# Patient Record
Sex: FEMALE | Race: WHITE | NOT HISPANIC OR LATINO | Employment: FULL TIME | URBAN - METROPOLITAN AREA
[De-identification: names, ages, dates, MRNs, and addresses within clinical notes are randomized per-mention and may not be internally consistent; named-entity substitution may affect disease eponyms.]

---

## 2017-12-11 ENCOUNTER — HISTORICAL (OUTPATIENT)
Dept: ADMINISTRATIVE | Facility: HOSPITAL | Age: 57
End: 2017-12-11

## 2018-03-06 ENCOUNTER — HISTORICAL (OUTPATIENT)
Dept: PREADMISSION TESTING | Facility: HOSPITAL | Age: 58
End: 2018-03-06

## 2018-03-15 ENCOUNTER — HISTORICAL (OUTPATIENT)
Dept: SURGERY | Facility: HOSPITAL | Age: 58
End: 2018-03-15

## 2018-05-07 ENCOUNTER — HISTORICAL (OUTPATIENT)
Dept: ADMINISTRATIVE | Facility: HOSPITAL | Age: 58
End: 2018-05-07

## 2019-12-06 ENCOUNTER — HISTORICAL (OUTPATIENT)
Dept: ADMINISTRATIVE | Facility: HOSPITAL | Age: 59
End: 2019-12-06

## 2021-08-16 ENCOUNTER — HISTORICAL (OUTPATIENT)
Dept: ADMINISTRATIVE | Facility: HOSPITAL | Age: 61
End: 2021-08-16

## 2021-09-20 ENCOUNTER — HISTORICAL (OUTPATIENT)
Dept: RADIOLOGY | Facility: HOSPITAL | Age: 61
End: 2021-09-20

## 2022-04-10 ENCOUNTER — HISTORICAL (OUTPATIENT)
Dept: ADMINISTRATIVE | Facility: HOSPITAL | Age: 62
End: 2022-04-10

## 2022-04-25 VITALS
OXYGEN SATURATION: 97 % | DIASTOLIC BLOOD PRESSURE: 72 MMHG | SYSTOLIC BLOOD PRESSURE: 120 MMHG | HEIGHT: 66 IN | BODY MASS INDEX: 22.04 KG/M2 | WEIGHT: 137.13 LBS

## 2022-04-30 NOTE — OP NOTE
Patient:   Joslyn Ramirez            MRN: [id number]            FIN: [id number]               Age:   57 years     Sex:  Female     :  1960   Associated Diagnoses:   None   Author:   Jonathan NATHAN MD, Johnnie MORRISON      SURGEON: Johnnie Castillo MD    PREOPERATIVE DIAGNOSIS:   Right knee loose body, osteochondral defect     POSTOPERATIVE DIAGNOSIS:   Right knee loose body, osteochondral defect, medial meniscus tear,     PROCEDURE PERFORMED:  1.  Right knee arthroscopic loose body removal  2.  Right knee arthroscopic microfracture to medial femoral condyle  3.  Right knee knee arthroscopic partial medial meniscectomy    ESTIMATED BLOOD LOSS: 10cc.      COMPLICATIONS: None.    TOURNIQUET TIME: 17 minutes.    ANTIBIOTICS: Ancef     INDICATIONS FOR PROCEDURE: Joslyn is a 57y.o. female who has had ongoing right knee pain. The patient has had to limit activity due to intermittent pain & occasional mechanical symptoms. An MRI was performed that showed a osteochondral defect of her medial femoral condyle with a loose body that I felt would be amenable to loose body removal and microfracture. The patient decided to opt for surgery after failing conservative management.    OPERATIVE REPORT: Joslyn was initially seen in the preoperative holding area where history and physical were reviewed without change. The operative leg was marked, consents were reviewed, and any questions were answered for the patient and family.The patient was then taken back to the operating room, placed supine on the operating table with all bony prominences padded. Then a nonsterile tourniquet was placed around the upper thigh. The patient was induced under general anesthesia.The operative lower extremity was prepped and draped in standard sterile fashion. A timeout led by the surgeon was performed, and preoperative antibiotics were given. The limp was exsanguinated with gravity. The tourniquet was raised to 100 mmHg over the systolic blood  pressure.    The knee was then flexed and the inferolateral portal was created with an #11 blade scalpel.    The camera was introduced, using the blunt trocar, into the suprapatellar pouch. The undersurface of the patella was found to have no chondral wear and the trochlear groove was found to have no chondral wear . The camera was then taken down into the lateral gutter, where no loose bodies and no plica were found. The camera was then brought into the medial gutter, where no loose bodies and no plica were seen. The camera was then brought into the medial compartment.    An inferomedial portal was then localized with a spinal needle, and created using an #11 blade.  The loose body was located in the medial compartment.  It was right in front of the donor site.  I used a grasper to remove this.  The bed of the OCD was then left.  It was approximately 1.5 x 1.5 cm.  It had a unstable cartilage on the medial aspect which was trimmed with a shaver.  I then used the microfracture awls to poke holes into the bed of the OCD in order to stimulate fibrocartilage.  The medial meniscus was probed and found to have a complex tear of the posterior horn. A combination of baskets and gabe were used to debride the meniscal flap down to a stable margin of approximately 80 percent remaining and then shaver was used to smooth the edges.The medial tibial plateau demonstrated no wear.    The camera was then turned to the notch, where the ACL was found to be intact.    Then the leg was brought into figure-of-four position. The camera was brought into the lateral compartment. The lateral meniscus was probed and found to be stable. The lateral femoral condyle was found to have no chondral wear. The lateral tibial plateau had no chondral wear.    The knee was once more examined for loose bodies, of which none were found. The knee was then evacuated of all fluids. The portals were closed with 3-0 monocyrl interrupted sutures and  steristrips. 10mL of 0.25% Bupivicaine were infiltrated around each portal. A sterile dressing was placed, and the tourniquet was deflated after 17 minutes. The patient was awakened from anesthesia and taken to the postoperative care unit in stable condition.

## 2022-05-04 ENCOUNTER — TELEPHONE (OUTPATIENT)
Dept: ADMINISTRATIVE | Facility: HOSPITAL | Age: 62
End: 2022-05-04

## 2022-05-04 DIAGNOSIS — M54.16 LUMBAR RADICULOPATHY: Primary | ICD-10-CM

## 2022-05-05 RX ORDER — GABAPENTIN 300 MG/1
CAPSULE ORAL
Qty: 90 CAPSULE | Refills: 2 | Status: SHIPPED | OUTPATIENT
Start: 2022-05-05 | End: 2022-06-14

## 2022-05-05 NOTE — TELEPHONE ENCOUNTER
I spoke with the patient.  She has pain only in the left leg, which is new since her last visit here in February.  The pain began Monday or Tuesday, no known injury or activity that triggered.  When the pain started, it involved the lateral thigh, all aspects of the lower leg, and plantar foot.  She describes it as feeling like a vice  around the leg.  She did not get any relief with Mobic.  She took Ibuprofen every 6hrs yesterday and says the pain is better today.  It is now mainly in the lateral lower leg.  She describes it as a burning nerve type pain.  She says that when she crouches down, like to pull weeds from her flower bed, the lower leg feels like it will explode.  She continues with pain and stiffness across the lower back, which is not new.  She continues to go to physical therapy for SI joint stabilization, which she feels is helping that pain.  She has also been wearing an SI belt.  She has been sleeping in the SI belt for the past 3-4 weeks and wonders if this could be causing something to pinch a nerve.  She denies any new weakness in the left leg.  She has problems with the right leg d/t a pulled hamstring.  She is seeing Dr. Castillo for this tomorrow.  She has seen Dr. Trinity Seay for SI joint injections, but did not feel like they helped much.  She says he mentioned that he thought her problem was coming from the facets.  She was asking about imaging to make sure nothing moved or is pushing on a nerve.  Her last MRI was in September, which was prior to the left leg pain.  She is already scheduled to follow up here on 5/19.  She is leaving next Wednesday to go on a trip.  They are flying and planning to hike.  We talked about adding gabapentin.  She has not taken it since before her lumbar surgery, but does not recall any side effects.  I also recommended she talk to Dr. Trinity Seay about a possible injection to cool things off and get her through the trip.  She is going to contact his office to  discuss.  She would like to try the gabapentin as well.  I gave her instructions.  She would like the medication sent in to [facility] in [place].  I told her we could work on setting up an MRI prior to her f/u if things do not improve.  She will update us on how she is doing early next week.

## 2022-05-06 ENCOUNTER — OFFICE VISIT (OUTPATIENT)
Dept: ORTHOPEDICS | Facility: CLINIC | Age: 62
End: 2022-05-06
Payer: COMMERCIAL

## 2022-05-06 VITALS
HEIGHT: 66 IN | DIASTOLIC BLOOD PRESSURE: 76 MMHG | BODY MASS INDEX: 20.89 KG/M2 | SYSTOLIC BLOOD PRESSURE: 122 MMHG | HEART RATE: 60 BPM | WEIGHT: 130 LBS

## 2022-05-06 DIAGNOSIS — M54.32 LEFT SIDED SCIATICA: ICD-10-CM

## 2022-05-06 DIAGNOSIS — M70.61 TROCHANTERIC BURSITIS, RIGHT HIP: Primary | ICD-10-CM

## 2022-05-06 PROCEDURE — 20610 LARGE JOINT ASPIRATION/INJECTION: R GREATER TROCHANTERIC BURSA: ICD-10-PCS | Mod: RT,,, | Performed by: ORTHOPAEDIC SURGERY

## 2022-05-06 PROCEDURE — 3074F SYST BP LT 130 MM HG: CPT | Mod: CPTII,,, | Performed by: ORTHOPAEDIC SURGERY

## 2022-05-06 PROCEDURE — 1159F MED LIST DOCD IN RCRD: CPT | Mod: CPTII,,, | Performed by: ORTHOPAEDIC SURGERY

## 2022-05-06 PROCEDURE — 20610 DRAIN/INJ JOINT/BURSA W/O US: CPT | Mod: RT,,, | Performed by: ORTHOPAEDIC SURGERY

## 2022-05-06 PROCEDURE — 3074F PR MOST RECENT SYSTOLIC BLOOD PRESSURE < 130 MM HG: ICD-10-PCS | Mod: CPTII,,, | Performed by: ORTHOPAEDIC SURGERY

## 2022-05-06 PROCEDURE — 3078F PR MOST RECENT DIASTOLIC BLOOD PRESSURE < 80 MM HG: ICD-10-PCS | Mod: CPTII,,, | Performed by: ORTHOPAEDIC SURGERY

## 2022-05-06 PROCEDURE — 1159F PR MEDICATION LIST DOCUMENTED IN MEDICAL RECORD: ICD-10-PCS | Mod: CPTII,,, | Performed by: ORTHOPAEDIC SURGERY

## 2022-05-06 PROCEDURE — 3008F PR BODY MASS INDEX (BMI) DOCUMENTED: ICD-10-PCS | Mod: CPTII,,, | Performed by: ORTHOPAEDIC SURGERY

## 2022-05-06 PROCEDURE — 3008F BODY MASS INDEX DOCD: CPT | Mod: CPTII,,, | Performed by: ORTHOPAEDIC SURGERY

## 2022-05-06 PROCEDURE — 99213 OFFICE O/P EST LOW 20 MIN: CPT | Mod: 25,,, | Performed by: ORTHOPAEDIC SURGERY

## 2022-05-06 PROCEDURE — 3078F DIAST BP <80 MM HG: CPT | Mod: CPTII,,, | Performed by: ORTHOPAEDIC SURGERY

## 2022-05-06 PROCEDURE — 99213 PR OFFICE/OUTPT VISIT, EST, LEVL III, 20-29 MIN: ICD-10-PCS | Mod: 25,,, | Performed by: ORTHOPAEDIC SURGERY

## 2022-05-06 RX ORDER — MELOXICAM 15 MG/1
15 TABLET ORAL DAILY
COMMUNITY
Start: 2022-04-06 | End: 2022-05-30

## 2022-05-06 RX ORDER — BETAMETHASONE SODIUM PHOSPHATE AND BETAMETHASONE ACETATE 3; 3 MG/ML; MG/ML
6 INJECTION, SUSPENSION INTRA-ARTICULAR; INTRALESIONAL; INTRAMUSCULAR; SOFT TISSUE
Status: DISCONTINUED | OUTPATIENT
Start: 2022-05-06 | End: 2022-05-06 | Stop reason: HOSPADM

## 2022-05-06 RX ORDER — METHYLPREDNISOLONE 4 MG/1
TABLET ORAL
Qty: 21 EACH | Refills: 0 | Status: SHIPPED | OUTPATIENT
Start: 2022-05-06 | End: 2022-05-27

## 2022-05-06 RX ORDER — LIDOCAINE HYDROCHLORIDE 20 MG/ML
5 INJECTION, SOLUTION EPIDURAL; INFILTRATION; INTRACAUDAL; PERINEURAL
Status: DISCONTINUED | OUTPATIENT
Start: 2022-05-06 | End: 2022-05-06 | Stop reason: HOSPADM

## 2022-05-06 RX ADMIN — BETAMETHASONE SODIUM PHOSPHATE AND BETAMETHASONE ACETATE 6 MG: 3; 3 INJECTION, SUSPENSION INTRA-ARTICULAR; INTRALESIONAL; INTRAMUSCULAR; SOFT TISSUE at 09:05

## 2022-05-06 RX ADMIN — LIDOCAINE HYDROCHLORIDE 5 ML: 20 INJECTION, SOLUTION EPIDURAL; INFILTRATION; INTRACAUDAL; PERINEURAL at 09:05

## 2022-05-06 NOTE — PROGRESS NOTES
Chief Complaint:   Chief Complaint   Patient presents with    Right Thigh - Injury    Pain     Patient states shes had a couple of falls since last seen thinks she might have made the tear worse, Left leg possible blood clot       Consulting Physician: No ref. provider found    History of present illness:    she  is a pleasant 61 y.o. year old female with right lateral hip pain.  She has had a couple falls.  But also think she really rotated in physical therapy when she had her SI joint adjusted.  The pain is located laterally.  It is worse with activity.  It is somewhat better at rest.  She has a hiking trip upcoming for her 62th birthday.  She denies any numbness or tingling although does have some pain over her left lower extremity.  She has has chronic back pain which has been previously managed with injections and formal physical therapy.    Past Medical History:   Diagnosis Date    Osteoarthritis        Past Surgical History:   Procedure Laterality Date    BACK SURGERY      lumbar and cervicle    KNEE SURGERY      NECK SURGERY      SINUS SURGERY         Current Outpatient Medications   Medication Sig    meloxicam (MOBIC) 15 MG tablet Take 15 mg by mouth once daily.     No current facility-administered medications for this visit.       Review of patient's allergies indicates:   Allergen Reactions    Codeine Hives       History reviewed. No pertinent family history.    Social History     Socioeconomic History    Marital status:    Tobacco Use    Smoking status: Never Smoker    Smokeless tobacco: Never Used   Substance and Sexual Activity    Alcohol use: Yes       Review of Systems:    Constitution:   Denies chills, fever, and sweats.  HENT:   Denies headaches or blurry vision.  Cardiovascular:  Denies chest pain or irregular heart beat.  Respiratory:   Denies cough or shortness of breath.  Gastrointestinal:  Denies abdominal pain, nausea, or vomiting.  Musculoskeletal:   Denies muscle  "cramps.  Neurological:   Denies dizziness or focal weakness.  Psychiatric/Behavior: Normal mental status.  Hematology/Lymph:  Denies bleeding problem or easy bruising/bleeding.  Skin:    Denies rash or suspicious lesions.    Examination:    Vital Signs:    Vitals:    05/06/22 0954   BP: 122/76   Pulse: 60   Weight: 59 kg (130 lb)   Height: 5' 6" (1.676 m)   PainSc:   5       Body mass index is 20.98 kg/m².    Constitution:   Well-developed, well nourished patient in no acute distress.  Neurological:   Alert and oriented x 3 and cooperative to examination.     Psychiatric/Behavior: Normal mental status.  Respiratory:   No shortness of breath.  Eyes:    Extraoccular muscles intact  Skin:    No scars, rash or suspicious lesions.    MSK:   Focused exam of the right hip shows full range of motion of the hip with flexion to 100°, internal rotation 20°, external rotation 30°.  She is tender over her external rotators and greater trochanter.           Assessment: Trochanteric bursitis, right hip    Left sided sciatica        Plan:  We are going to try a right hip greater trochanteric bursa injection today.  Will also start a Medrol Dosepak for her left-sided sciatica  "

## 2022-05-06 NOTE — PROCEDURES
Large Joint Aspiration/Injection: R greater trochanteric bursa    Date/Time: 5/6/2022 9:45 AM  Performed by: Johnnie Castillo Jr., MD  Authorized by: Johnnie Castillo Jr., MD     Consent Done?:  Yes (Verbal)  Indications:  Arthritis  Site marked: the procedure site was marked    Timeout: prior to procedure the correct patient, procedure, and site was verified    Prep: patient was prepped and draped in usual sterile fashion    Local anesthesia used?: No      Details:  Needle Size:  21 G  Ultrasonic Guidance for needle placement?: No    Approach:  Lateral  Location:  Hip  Site:  R greater trochanteric bursa  Medications:  5 mL LIDOcaine (PF) 20 mg/mL (2%) 20 mg/mL (2 %); 6 mg betamethasone acetate-betamethasone sodium phosphate 6 mg/mL  Patient tolerance:  Patient tolerated the procedure well with no immediate complications

## 2022-05-11 ENCOUNTER — TELEPHONE (OUTPATIENT)
Dept: ADMINISTRATIVE | Facility: HOSPITAL | Age: 62
End: 2022-05-11

## 2022-05-11 DIAGNOSIS — M54.9 DORSALGIA, UNSPECIFIED: ICD-10-CM

## 2022-05-16 NOTE — TELEPHONE ENCOUNTER
Spoke with patient in regards to her MRI still not being scheduled and she requested to just have it sent to [facility]. Faxed ordered STAT.

## 2022-05-17 ENCOUNTER — TELEPHONE (OUTPATIENT)
Dept: ADMINISTRATIVE | Facility: HOSPITAL | Age: 62
End: 2022-05-17
Payer: COMMERCIAL

## 2022-05-25 RX ORDER — ASCORBIC ACID 500 MG
500 TABLET ORAL DAILY
COMMUNITY

## 2022-05-25 RX ORDER — AZITHROMYCIN 100 MG/5ML
POWDER, FOR SUSPENSION ORAL DAILY
COMMUNITY
Start: 2022-02-08

## 2022-05-25 RX ORDER — UBIDECARENONE 30 MG
300 CAPSULE ORAL DAILY
COMMUNITY

## 2022-05-25 RX ORDER — MUPIROCIN 20 MG/G
OINTMENT TOPICAL 3 TIMES DAILY
COMMUNITY
Start: 2022-02-08

## 2022-05-25 RX ORDER — CYCLOBENZAPRINE HCL 10 MG
10 TABLET ORAL 3 TIMES DAILY PRN
COMMUNITY
Start: 2021-12-02

## 2022-05-25 RX ORDER — NAPROXEN SODIUM 220 MG/1
81 TABLET, FILM COATED ORAL DAILY
COMMUNITY

## 2022-05-25 RX ORDER — AMOXICILLIN 500 MG
CAPSULE ORAL DAILY
COMMUNITY

## 2022-05-25 RX ORDER — ACETAMINOPHEN 500 MG
5000 TABLET ORAL DAILY
COMMUNITY

## 2022-05-25 RX ORDER — IBUPROFEN 200 MG
3 CAPSULE ORAL NIGHTLY
COMMUNITY

## 2022-05-25 RX ORDER — ESTRADIOL 0.1 MG/G
CREAM VAGINAL
COMMUNITY
Start: 2022-04-29

## 2022-05-25 RX ORDER — MULTIVITAMIN
1 TABLET ORAL DAILY
COMMUNITY

## 2022-05-30 ENCOUNTER — OFFICE VISIT (OUTPATIENT)
Dept: NEUROSURGERY | Facility: CLINIC | Age: 62
End: 2022-05-30
Payer: COMMERCIAL

## 2022-05-30 VITALS
HEART RATE: 45 BPM | HEIGHT: 66 IN | RESPIRATION RATE: 18 BRPM | WEIGHT: 133 LBS | BODY MASS INDEX: 21.38 KG/M2 | DIASTOLIC BLOOD PRESSURE: 64 MMHG | SYSTOLIC BLOOD PRESSURE: 97 MMHG

## 2022-05-30 DIAGNOSIS — M51.36 LUMBAR DEGENERATIVE DISC DISEASE: Primary | ICD-10-CM

## 2022-05-30 DIAGNOSIS — M46.1 SI (SACROILIAC) JOINT INFLAMMATION: ICD-10-CM

## 2022-05-30 PROBLEM — M51.369 LUMBAR DEGENERATIVE DISC DISEASE: Status: ACTIVE | Noted: 2022-05-30

## 2022-05-30 PROCEDURE — 99213 PR OFFICE/OUTPT VISIT, EST, LEVL III, 20-29 MIN: ICD-10-PCS | Mod: ,,, | Performed by: PHYSICIAN ASSISTANT

## 2022-05-30 PROCEDURE — 3074F PR MOST RECENT SYSTOLIC BLOOD PRESSURE < 130 MM HG: ICD-10-PCS | Mod: CPTII,,, | Performed by: PHYSICIAN ASSISTANT

## 2022-05-30 PROCEDURE — 99213 OFFICE O/P EST LOW 20 MIN: CPT | Mod: ,,, | Performed by: PHYSICIAN ASSISTANT

## 2022-05-30 PROCEDURE — 1160F PR REVIEW ALL MEDS BY PRESCRIBER/CLIN PHARMACIST DOCUMENTED: ICD-10-PCS | Mod: CPTII,,, | Performed by: PHYSICIAN ASSISTANT

## 2022-05-30 PROCEDURE — 1160F RVW MEDS BY RX/DR IN RCRD: CPT | Mod: CPTII,,, | Performed by: PHYSICIAN ASSISTANT

## 2022-05-30 PROCEDURE — 3078F PR MOST RECENT DIASTOLIC BLOOD PRESSURE < 80 MM HG: ICD-10-PCS | Mod: CPTII,,, | Performed by: PHYSICIAN ASSISTANT

## 2022-05-30 PROCEDURE — 1159F MED LIST DOCD IN RCRD: CPT | Mod: CPTII,,, | Performed by: PHYSICIAN ASSISTANT

## 2022-05-30 PROCEDURE — 1159F PR MEDICATION LIST DOCUMENTED IN MEDICAL RECORD: ICD-10-PCS | Mod: CPTII,,, | Performed by: PHYSICIAN ASSISTANT

## 2022-05-30 PROCEDURE — 3074F SYST BP LT 130 MM HG: CPT | Mod: CPTII,,, | Performed by: PHYSICIAN ASSISTANT

## 2022-05-30 PROCEDURE — 3078F DIAST BP <80 MM HG: CPT | Mod: CPTII,,, | Performed by: PHYSICIAN ASSISTANT

## 2022-05-30 PROCEDURE — 3008F BODY MASS INDEX DOCD: CPT | Mod: CPTII,,, | Performed by: PHYSICIAN ASSISTANT

## 2022-05-30 PROCEDURE — 3008F PR BODY MASS INDEX (BMI) DOCUMENTED: ICD-10-PCS | Mod: CPTII,,, | Performed by: PHYSICIAN ASSISTANT

## 2022-05-30 RX ORDER — DICLOFENAC SODIUM 75 MG/1
75 TABLET, DELAYED RELEASE ORAL 2 TIMES DAILY
Qty: 60 TABLET | Refills: 5 | Status: SHIPPED | OUTPATIENT
Start: 2022-05-30

## 2022-05-30 NOTE — PROGRESS NOTES
[facility]  History & Physical  Neurosurgery      Joslyn Ramirez   [id number]   1960       CHIEF COMPLAINT:  Back pain    HPI:  Joslyn Ramirez is a 61 y.o. female who presents for neurosurgical evaluation.  She is well known to Dr. Dodge.  On 12 09/20/2015, she underwent C3-4 ACDF with C4-5 and C5-6 total disc arthroplasty with Dr. Blanchard.  On 05/28/2015, the patient underwent L3-4 and L4-5 ALIF with MIS pedicle screws.  In mid July of 2021, she developed lower back pain with pain in the right SI joint.  In addition, she had a partial tear of the right hamstring tendon.  She was seen by Dr. Castillo for that injury.  She attempted pelvic floor exercises.  If her pain persisted.  She was seen by myself on August 26, 2021. Lumbar MRI was obtained at that time.  Her symptoms seem to be draining more from her SI joints than the lumbar spine.  She underwent bilateral SI joint injections on 01/26/2022 with Dr. Akua Seay.  She did see main improvement in her symptoms after those injections.  She has undergone an extensive course of physical therapy.  The patient was scheduled to follow up after physical therapy.  She contacted our office because she was experiencing worsening pain in the lower back and leg.  Therefore MRI of the lumbar spine was obtained prior to this visit.    Several weeks ago, the patient experienced pain in the left lower leg and foot of insidious onset.  She reports that she had pain along the lateral lower leg, through the knee and also along the inner lower leg.  She also had pain at times into the dorsal foot.  The pain was at increased with activity, especially stooping to pull weeds.  The pain was persistent and severe at times.  Over the past 1-2 weeks, this leg pain has improved.  The pain at her SI joint pain has improved with the injection and physical therapy.  However, she continues with lower back pain.  She is able to participate in all activities that she desires with  the exception of running.  [facility]  In addition, she continues with pain at the insertion of the right hamstring.  It was aggravated with physical therapy.  She was seen by Dr. Castillo 2 weeks ago.  He gave her cortisone injection at the area.  She is seen today for follow-up.      Past Medical History:   Diagnosis Date    AP (abdominal pain)     Arthritis     Bilateral sacroiliitis     Cervical spondylosis with myelopathy     Congenital spondylolisthesis of lumbar region     Degenerative spondylolisthesis     Lumbar pain     Neck pain     Osteoarthritis     Osteopenia     Other intervertebral disc degeneration, lumbar region     Other spondylosis with radiculopathy, lumbar region        Past Surgical History:   Procedure Laterality Date    C3-4 ACDF C4-5, C5-6 TDR  12/29/2015    Dr. Blanchard    drainage of lymphocele  2015    Dr. Giles    L3-4, L4-5 ALIF, MIS pedicle screws  05/28/2015    Dr. Dodge    SINUS SURGERY  02/2005       Family History   Problem Relation Age of Onset    Asthma Mother     Hyperlipidemia Father     Hypertension Father     Alcohol abuse Father     Lung disease Father     Arthritis Sister        Social History     Socioeconomic History    Marital status:    Tobacco Use    Smoking status: Never Smoker    Smokeless tobacco: Never Used   Substance and Sexual Activity    Alcohol use: Yes   Social History Narrative    ** Merged History Encounter **            Review of patient's allergies indicates:   Allergen Reactions    Codeine Hives          ROS:    Review of Systems   Constitutional: Negative for chills and fever.   HENT: Negative for nosebleeds and sore throat.    Eyes: Negative for pain and visual disturbance.   Respiratory: Negative for cough, chest tightness and shortness of breath.    Cardiovascular: Negative for chest pain.   Gastrointestinal: Negative for diarrhea, nausea and vomiting.  "  Genitourinary: Negative for difficulty urinating, dysuria, hematuria and pelvic pain.   Musculoskeletal: Positive for back pain. Negative for gait problem and myalgias.   Skin: Negative for rash.   Neurological: Negative for dizziness, facial asymmetry, weakness and headaches.   Psychiatric/Behavioral: Negative for confusion and sleep disturbance. The patient is not nervous/anxious.        PE:    BP 97/64 (BP Location: Right arm, Patient Position: Sitting, BP Method: Medium (Automatic))   Pulse (!) 45   Resp 18   Ht 5' 6" (1.676 m)   Wt 60.3 kg (133 lb)   BMI 21.47 kg/m²     General:  Pleasant. Well-nourished. Well-groomed.    Lungs:  Quiet, non-labored    Musculoskeletal:   SI joint palpation: Denies tenderness to SI joint palpation bilaterally.    Neurological:    Oriented to Person, Place, Time   Muscle strength against resistance:    Iliopsoas Quadriceps Knee  Flexion Tibialis  anterior Gastro- cnemius EHL   Lower: R 5/5 5/5 5/5 5/5 5/5 5/5    L 5/5 5/5 5/5 5/5 5/5 5/5     Sensation is intact in bilateral lower extremities to a light touch.  Gait:  Within normal limits  Coordination:  Within normal limits      MRI of the lumbar spine was obtained on 05/23/2022.  This study shows disc bulging and facet hypertrophy at L1-2 and L2-3.  There is mild bilateral foraminal stenosis bilaterally at L1-2 and moderate bilateral foraminal stenosis at L2-3.      ASSESSMENT/PLAN:     1. Lumbar degenerative disc disease     2. SI (sacroiliac) joint inflammation         Options were discussed at length with the patient.  She will continue with her home exercise program.  She notes Magui Perez offered for her to drop in if she needed.  When she was seen by Dr. Akua Seay for the SI joint injection, he discussed performing facet injections if the SI joint injection did not help.  She is considering this.  However, on she will wait on the injections due to the amount of cortisone she has received in the past several months. "  She will return to our office on an as-needed basis.    A total of 15 minutes were spent face to face with the patient.  Over half of the time was used to coordinate care.  Additional time was used to review the chart, Lumbar MRI and compare with old, lumbar x-rays, Dr. Trinity Seay's notes, and work on office note.

## 2022-10-10 ENCOUNTER — OFFICE VISIT (OUTPATIENT)
Dept: ORTHOPEDICS | Facility: CLINIC | Age: 62
End: 2022-10-10
Payer: COMMERCIAL

## 2022-10-10 VITALS
HEIGHT: 66 IN | WEIGHT: 133 LBS | SYSTOLIC BLOOD PRESSURE: 124 MMHG | HEART RATE: 45 BPM | BODY MASS INDEX: 21.38 KG/M2 | DIASTOLIC BLOOD PRESSURE: 74 MMHG

## 2022-10-10 DIAGNOSIS — S76.011A STRAIN OF GLUTEUS MEDIUS OF RIGHT LOWER EXTREMITY, INITIAL ENCOUNTER: Primary | ICD-10-CM

## 2022-10-10 PROCEDURE — 3074F PR MOST RECENT SYSTOLIC BLOOD PRESSURE < 130 MM HG: ICD-10-PCS | Mod: CPTII,,, | Performed by: ORTHOPAEDIC SURGERY

## 2022-10-10 PROCEDURE — 3078F DIAST BP <80 MM HG: CPT | Mod: CPTII,,, | Performed by: ORTHOPAEDIC SURGERY

## 2022-10-10 PROCEDURE — 99213 PR OFFICE/OUTPT VISIT, EST, LEVL III, 20-29 MIN: ICD-10-PCS | Mod: ,,, | Performed by: ORTHOPAEDIC SURGERY

## 2022-10-10 PROCEDURE — 3074F SYST BP LT 130 MM HG: CPT | Mod: CPTII,,, | Performed by: ORTHOPAEDIC SURGERY

## 2022-10-10 PROCEDURE — 99213 OFFICE O/P EST LOW 20 MIN: CPT | Mod: ,,, | Performed by: ORTHOPAEDIC SURGERY

## 2022-10-10 PROCEDURE — 3008F BODY MASS INDEX DOCD: CPT | Mod: CPTII,,, | Performed by: ORTHOPAEDIC SURGERY

## 2022-10-10 PROCEDURE — 1159F MED LIST DOCD IN RCRD: CPT | Mod: CPTII,,, | Performed by: ORTHOPAEDIC SURGERY

## 2022-10-10 PROCEDURE — 3008F PR BODY MASS INDEX (BMI) DOCUMENTED: ICD-10-PCS | Mod: CPTII,,, | Performed by: ORTHOPAEDIC SURGERY

## 2022-10-10 PROCEDURE — 3078F PR MOST RECENT DIASTOLIC BLOOD PRESSURE < 80 MM HG: ICD-10-PCS | Mod: CPTII,,, | Performed by: ORTHOPAEDIC SURGERY

## 2022-10-10 PROCEDURE — 1159F PR MEDICATION LIST DOCUMENTED IN MEDICAL RECORD: ICD-10-PCS | Mod: CPTII,,, | Performed by: ORTHOPAEDIC SURGERY

## 2022-10-10 RX ORDER — MELOXICAM 15 MG/1
15 TABLET ORAL DAILY
COMMUNITY

## 2022-10-10 NOTE — PROGRESS NOTES
Chief Complaint:   Chief Complaint   Patient presents with    Right Hip - Pain    Leg Pain     right upper leg pain, pain is located near hip, aches/burns with movement, external rotation and latermovement is restricted, no icing/heating/PT       Consulting Physician: No ref. provider found    History of present illness:    she  is a pleasant 62 y.o. year old female with right lateral hip pain.  She has had a couple falls.  But also think she really rotated in physical therapy when she had her SI joint adjusted.  The pain is located laterally.  It is worse with activity.  It is somewhat better at rest.  She denies any numbness or tingling although does have some pain over her left lower extremity.  She has has chronic back pain which has been previously managed with injections and formal physical therapy.  We tried an injection in May of 2022 which was able to get her through a hike trip but her pain has recurred.    Past Medical History:   Diagnosis Date    AP (abdominal pain)     Arthritis     Bilateral sacroiliitis     Cervical spondylosis with myelopathy     Congenital spondylolisthesis of lumbar region     Degenerative spondylolisthesis     Lumbar pain     Neck pain     Osteoarthritis     Osteopenia     Other intervertebral disc degeneration, lumbar region     Other spondylosis with radiculopathy, lumbar region        Past Surgical History:   Procedure Laterality Date    C3-4 ACDF C4-5, C5-6 TDR  12/29/2015    Dr. Blanchard    drainage of lymphocele  2015    Dr. Giles    L3-4, L4-5 ALIF, MIS pedicle screws  05/28/2015    Dr. Dodge    SINUS SURGERY  02/2005       Current Outpatient Medications   Medication Sig    ascorbic acid, vitamin C, (VITAMIN C) 500 MG tablet Take 500 mg by mouth once daily.    aspirin 81 MG Chew Take 81 mg by mouth once daily.    calcium citrate (CALCITRATE) 200 mg (950 mg) tablet Take 3 tablets by mouth nightly.    cholecalciferol, vitamin D3, 125 mcg (5,000 unit) Tab Take 5,000 Units  by mouth once daily.    co-enzyme Q-10 30 mg capsule Take 300 mg by mouth Daily.    diclofenac (VOLTAREN) 75 MG EC tablet Take 1 tablet (75 mg total) by mouth 2 (two) times daily.    estradioL (ESTRACE) 0.01 % (0.1 mg/gram) vaginal cream every 30 days.    glucosamine/chondroitin/C/Luis A (GLUCOSAMINE 1500 COMPLEX ORAL) Take by mouth 3 (three) times daily.    L. acidophilus/L. rhamnosus (FLORAJEN WOMEN ORAL) Take by mouth nightly.    meloxicam (MOBIC) 15 MG tablet Take 15 mg by mouth once daily.    multivitamin (THERAGRAN) per tablet Take 1 tablet by mouth once daily.    mupirocin (BACTROBAN) 2 % ointment 3 (three) times daily.    omega-3 fatty acids/fish oil (FISH OIL-OMEGA-3 FATTY ACIDS) 300-1,000 mg capsule Take by mouth once daily.    vitamin B complex (B COMPLEX ORAL) Take by mouth Daily.    VITAMIN K2 ORAL Take by mouth Daily.    azithromycin (ZITHROMAX) 100 mg/5 mL suspension Daily.    cyclobenzaprine (FLEXERIL) 10 MG tablet Take 10 mg by mouth 3 (three) times daily as needed.    gabapentin (NEURONTIN) 300 MG capsule Take 1 capsule (300 mg total) by mouth every evening for 5 days, THEN 1 capsule (300 mg total) 2 (two) times daily for 5 days, THEN 1 capsule (300 mg total) 3 (three) times daily. (Patient not taking: Reported on 5/30/2022)     No current facility-administered medications for this visit.       Review of patient's allergies indicates:   Allergen Reactions    Codeine Hives       Family History   Problem Relation Age of Onset    Asthma Mother     Hyperlipidemia Father     Hypertension Father     Alcohol abuse Father     Lung disease Father     Arthritis Sister        Social History     Socioeconomic History    Marital status:    Tobacco Use    Smoking status: Never    Smokeless tobacco: Never   Substance and Sexual Activity    Alcohol use: Yes     Alcohol/week: 4.0 standard drinks     Types: 4 Glasses of wine per week    Drug use: Never   Social History Narrative    ** Merged History Encounter  "**            Review of Systems:    Constitution:   Denies chills, fever, and sweats.  HENT:   Denies headaches or blurry vision.  Cardiovascular:  Denies chest pain or irregular heart beat.  Respiratory:   Denies cough or shortness of breath.  Gastrointestinal:  Denies abdominal pain, nausea, or vomiting.  Musculoskeletal:   Denies muscle cramps.  Neurological:   Denies dizziness or focal weakness.  Psychiatric/Behavior: Normal mental status.  Hematology/Lymph:  Denies bleeding problem or easy bruising/bleeding.  Skin:    Denies rash or suspicious lesions.    Examination:    Vital Signs:    Vitals:    10/10/22 1330   BP: 124/74   Pulse: (!) 45   Weight: 60.3 kg (133 lb)   Height: 5' 6" (1.676 m)   PainSc:   6       Body mass index is 21.47 kg/m².    Constitution:   Well-developed, well nourished patient in no acute distress.  Neurological:   Alert and oriented x 3 and cooperative to examination.     Psychiatric/Behavior: Normal mental status.  Respiratory:   No shortness of breath.  Eyes:    Extraoccular muscles intact  Skin:    No scars, rash or suspicious lesions.    MSK:   Focused exam of the right hip shows full range of motion of the hip with flexion to 100°, internal rotation 20°, external rotation 30°.  She is tender over her external rotators and greater trochanter.           Assessment: Strain of gluteus medius of right lower extremity, initial encounter  -     MRI Hip Without Contrast Right; Future; Expected date: 10/10/2022      Plan:  MRI to evaluate her is.  I will see her back after for review  "

## 2022-10-17 ENCOUNTER — OFFICE VISIT (OUTPATIENT)
Dept: ORTHOPEDICS | Facility: CLINIC | Age: 62
End: 2022-10-17
Payer: COMMERCIAL

## 2022-10-17 DIAGNOSIS — M16.11 PRIMARY OSTEOARTHRITIS OF RIGHT HIP: Primary | ICD-10-CM

## 2022-10-17 PROCEDURE — 99213 PR OFFICE/OUTPT VISIT, EST, LEVL III, 20-29 MIN: ICD-10-PCS | Mod: ,,, | Performed by: ORTHOPAEDIC SURGERY

## 2022-10-17 PROCEDURE — 99213 OFFICE O/P EST LOW 20 MIN: CPT | Mod: ,,, | Performed by: ORTHOPAEDIC SURGERY

## 2022-10-17 PROCEDURE — 1159F PR MEDICATION LIST DOCUMENTED IN MEDICAL RECORD: ICD-10-PCS | Mod: CPTII,,, | Performed by: ORTHOPAEDIC SURGERY

## 2022-10-17 PROCEDURE — 1159F MED LIST DOCD IN RCRD: CPT | Mod: CPTII,,, | Performed by: ORTHOPAEDIC SURGERY

## 2022-10-17 NOTE — PROGRESS NOTES
Chief Complaint:   Chief Complaint   Patient presents with    Right Hip - Pain    Pain     Right hip MRI Results. Unable to obtain vitals.   mn       Consulting Physician: No ref. provider found    History of present illness:    she  is a pleasant 62 y.o. year old female with right lateral hip pain.  She has had a couple falls.  But also think she really rotated in physical therapy when she had her SI joint adjusted.  The pain is located laterally.  It is worse with activity.  It is somewhat better at rest.  She denies any numbness or tingling although does have some pain over her left lower extremity.  She has has chronic back pain which has been previously managed with injections and formal physical therapy.  We tried an injection in May of 2022 which was able to get her through a hike trip but her pain has recurred.    She returns after MRI.    Past Medical History:   Diagnosis Date    AP (abdominal pain)     Arthritis     Bilateral sacroiliitis     Cervical spondylosis with myelopathy     Congenital spondylolisthesis of lumbar region     Degenerative spondylolisthesis     Lumbar pain     Neck pain     Osteoarthritis     Osteopenia     Other intervertebral disc degeneration, lumbar region     Other spondylosis with radiculopathy, lumbar region        Past Surgical History:   Procedure Laterality Date    C3-4 ACDF C4-5, C5-6 TDR  12/29/2015    Dr. Blanchard    drainage of lymphocele  2015    Dr. Giles    L3-4, L4-5 ALIF, MIS pedicle screws  05/28/2015    Dr. Dodge    SINUS SURGERY  02/2005       Current Outpatient Medications   Medication Sig    ascorbic acid, vitamin C, (VITAMIN C) 500 MG tablet Take 500 mg by mouth once daily.    aspirin 81 MG Chew Take 81 mg by mouth once daily.    azithromycin (ZITHROMAX) 100 mg/5 mL suspension Daily.    calcium citrate (CALCITRATE) 200 mg (950 mg) tablet Take 3 tablets by mouth nightly.    cholecalciferol, vitamin D3, 125 mcg (5,000 unit) Tab Take 5,000 Units by mouth once  daily.    co-enzyme Q-10 30 mg capsule Take 300 mg by mouth Daily.    cyclobenzaprine (FLEXERIL) 10 MG tablet Take 10 mg by mouth 3 (three) times daily as needed.    diclofenac (VOLTAREN) 75 MG EC tablet Take 1 tablet (75 mg total) by mouth 2 (two) times daily.    estradioL (ESTRACE) 0.01 % (0.1 mg/gram) vaginal cream every 30 days.    glucosamine/chondroitin/C/Luis A (GLUCOSAMINE 1500 COMPLEX ORAL) Take by mouth 3 (three) times daily.    L. acidophilus/L. rhamnosus (FLORAJEN WOMEN ORAL) Take by mouth nightly.    meloxicam (MOBIC) 15 MG tablet Take 15 mg by mouth once daily.    multivitamin (THERAGRAN) per tablet Take 1 tablet by mouth once daily.    mupirocin (BACTROBAN) 2 % ointment 3 (three) times daily.    omega-3 fatty acids/fish oil (FISH OIL-OMEGA-3 FATTY ACIDS) 300-1,000 mg capsule Take by mouth once daily.    vitamin B complex (B COMPLEX ORAL) Take by mouth Daily.    VITAMIN K2 ORAL Take by mouth Daily.    gabapentin (NEURONTIN) 300 MG capsule Take 1 capsule (300 mg total) by mouth every evening for 5 days, THEN 1 capsule (300 mg total) 2 (two) times daily for 5 days, THEN 1 capsule (300 mg total) 3 (three) times daily. (Patient not taking: Reported on 5/30/2022)     No current facility-administered medications for this visit.       Review of patient's allergies indicates:   Allergen Reactions    Codeine Hives       Family History   Problem Relation Age of Onset    Asthma Mother     Hyperlipidemia Father     Hypertension Father     Alcohol abuse Father     Lung disease Father     Arthritis Sister        Social History     Socioeconomic History    Marital status:    Tobacco Use    Smoking status: Never    Smokeless tobacco: Never   Substance and Sexual Activity    Alcohol use: Yes     Alcohol/week: 4.0 standard drinks     Types: 4 Glasses of wine per week    Drug use: Never   Social History Narrative    ** Merged History Encounter **            Review of Systems:    Constitution:   Denies chills, fever,  and sweats.  HENT:   Denies headaches or blurry vision.  Cardiovascular:  Denies chest pain or irregular heart beat.  Respiratory:   Denies cough or shortness of breath.  Gastrointestinal:  Denies abdominal pain, nausea, or vomiting.  Musculoskeletal:   Denies muscle cramps.  Neurological:   Denies dizziness or focal weakness.  Psychiatric/Behavior: Normal mental status.  Hematology/Lymph:  Denies bleeding problem or easy bruising/bleeding.  Skin:    Denies rash or suspicious lesions.    Examination:    Vital Signs:    There were no vitals filed for this visit.      There is no height or weight on file to calculate BMI.    Constitution:   Well-developed, well nourished patient in no acute distress.  Neurological:   Alert and oriented x 3 and cooperative to examination.     Psychiatric/Behavior: Normal mental status.  Respiratory:   No shortness of breath.  Eyes:    Extraoccular muscles intact  Skin:    No scars, rash or suspicious lesions.    MSK:   Focused exam of the right hip shows full range of motion of the hip with flexion to 100°, internal rotation 20°, external rotation 30°.  She is tender over her external rotators and greater trochanter.           Assessment: Primary osteoarthritis of right hip      Plan:  Symptomatic treatment with the [employer], home exercise program.

## 2023-05-09 ENCOUNTER — TELEPHONE (OUTPATIENT)
Dept: NEUROSURGERY | Facility: CLINIC | Age: 63
End: 2023-05-09
Payer: COMMERCIAL

## 2023-05-09 DIAGNOSIS — M51.36 LUMBAR DEGENERATIVE DISC DISEASE: Primary | ICD-10-CM

## 2023-05-15 NOTE — TELEPHONE ENCOUNTER
My schedule, or Surek[facility], next available.  Call and ask Dr. Trinity Mendes's office if she would need another referral.  I put order in for lumbar x-rays.

## 2023-05-15 NOTE — TELEPHONE ENCOUNTER
I called the pt to schedule an appt w/ Dr. Thomas per Jayne's suggestion. The pt states that she is not interested in surgical intervention. She would like that to be her absolute last resort. I mentioned that Dr. Thomas could recommend other things that could possibly help her back pain besides surgery. She mentioned that the injections that Kimberly referred her for w/ Dr. Trinity Seay previously helped a lot so she would really like to have more done but per Dr. Trinity Seay she needs another referral. I told her when Dr. Thomas's next available appt was and also offered to put her on the waitlist but she states she cannot wait that long just for an injection referral. She wants to know if this is something we can just send or if she has to come in. If she does she is requesting an appt w/ Kimberly. Please advise.

## 2023-05-18 DIAGNOSIS — M51.36 LUMBAR DEGENERATIVE DISC DISEASE: Primary | ICD-10-CM

## 2023-05-18 NOTE — TELEPHONE ENCOUNTER
I called the pt to schedule an appt per Kimberly's instructions. She is scheduled on 6/13/23 w/ Kimberly.

## 2023-05-18 NOTE — TELEPHONE ENCOUNTER
So I called Dr. Trinity Seay's office and they will need another referral for injections. Is this something that can be sent before her appt or does she need to wait for her appt with you on 6/13? Please advise

## 2023-07-14 NOTE — TELEPHONE ENCOUNTER
The patient called to make you aware that she is scheduled to have a MBB with Dr. Trinity Seay on 7/21/23 and possible rhizotomy later.  She asked to cancel the appointment with you on 7/20/23 and continue working with Dr. Petersen.  If at some point conservative treatment is no longer working she will call back to reschedule.  She is not ready to move forward with any kind of surgery.

## 2023-09-18 ENCOUNTER — TELEPHONE (OUTPATIENT)
Dept: ORTHOPEDICS | Facility: CLINIC | Age: 63
End: 2023-09-18
Payer: COMMERCIAL

## 2023-09-27 ENCOUNTER — OFFICE VISIT (OUTPATIENT)
Dept: ORTHOPEDICS | Facility: CLINIC | Age: 63
End: 2023-09-27
Payer: COMMERCIAL

## 2023-09-27 ENCOUNTER — HOSPITAL ENCOUNTER (OUTPATIENT)
Dept: RADIOLOGY | Facility: CLINIC | Age: 63
Discharge: HOME | End: 2023-09-27
Attending: ORTHOPAEDIC SURGERY
Payer: COMMERCIAL

## 2023-09-27 VITALS
WEIGHT: 132 LBS | SYSTOLIC BLOOD PRESSURE: 124 MMHG | DIASTOLIC BLOOD PRESSURE: 73 MMHG | BODY MASS INDEX: 21.21 KG/M2 | HEIGHT: 66 IN | HEART RATE: 50 BPM

## 2023-09-27 DIAGNOSIS — M16.11 PRIMARY OSTEOARTHRITIS OF RIGHT HIP: ICD-10-CM

## 2023-09-27 DIAGNOSIS — S76.311A STRAIN OF RIGHT HAMSTRING, INITIAL ENCOUNTER: Primary | ICD-10-CM

## 2023-09-27 DIAGNOSIS — M25.551 PAIN OF RIGHT HIP: ICD-10-CM

## 2023-09-27 PROCEDURE — 3074F PR MOST RECENT SYSTOLIC BLOOD PRESSURE < 130 MM HG: ICD-10-PCS | Mod: CPTII,,, | Performed by: ORTHOPAEDIC SURGERY

## 2023-09-27 PROCEDURE — 99213 OFFICE O/P EST LOW 20 MIN: CPT | Mod: ,,, | Performed by: ORTHOPAEDIC SURGERY

## 2023-09-27 PROCEDURE — 73502 XR HIP WITH PELVIS WHEN PERFORMED, 2 OR 3  VIEWS RIGHT: ICD-10-PCS | Mod: RT,,, | Performed by: ORTHOPAEDIC SURGERY

## 2023-09-27 PROCEDURE — 73502 X-RAY EXAM HIP UNI 2-3 VIEWS: CPT | Mod: RT,,, | Performed by: ORTHOPAEDIC SURGERY

## 2023-09-27 PROCEDURE — 3074F SYST BP LT 130 MM HG: CPT | Mod: CPTII,,, | Performed by: ORTHOPAEDIC SURGERY

## 2023-09-27 PROCEDURE — 3078F PR MOST RECENT DIASTOLIC BLOOD PRESSURE < 80 MM HG: ICD-10-PCS | Mod: CPTII,,, | Performed by: ORTHOPAEDIC SURGERY

## 2023-09-27 PROCEDURE — 1159F PR MEDICATION LIST DOCUMENTED IN MEDICAL RECORD: ICD-10-PCS | Mod: CPTII,,, | Performed by: ORTHOPAEDIC SURGERY

## 2023-09-27 PROCEDURE — 3008F PR BODY MASS INDEX (BMI) DOCUMENTED: ICD-10-PCS | Mod: CPTII,,, | Performed by: ORTHOPAEDIC SURGERY

## 2023-09-27 PROCEDURE — 3078F DIAST BP <80 MM HG: CPT | Mod: CPTII,,, | Performed by: ORTHOPAEDIC SURGERY

## 2023-09-27 PROCEDURE — 99213 PR OFFICE/OUTPT VISIT, EST, LEVL III, 20-29 MIN: ICD-10-PCS | Mod: ,,, | Performed by: ORTHOPAEDIC SURGERY

## 2023-09-27 PROCEDURE — 3008F BODY MASS INDEX DOCD: CPT | Mod: CPTII,,, | Performed by: ORTHOPAEDIC SURGERY

## 2023-09-27 PROCEDURE — 1159F MED LIST DOCD IN RCRD: CPT | Mod: CPTII,,, | Performed by: ORTHOPAEDIC SURGERY

## 2023-09-27 NOTE — PROGRESS NOTES
Chief Complaint:   Chief Complaint   Patient presents with    Right Hip - Pain    Hip Pain     F/u for right hip hamstring/hip pain, states her pain is doing better but she is having trouble with hip external rotation, feels like there is a dent in her leg and that is where her pain is       Consulting Physician: No ref. provider found    History of present illness:    she  is a pleasant 63 y.o. year old female  who is developed right hip and hamstring pain.  She initially fell while at the gym in July of 2021.  The pain is located over buttocks and somewhat lateral.  She knows it worse with lunges and working out.  It is somewhat better with rest.  She is tried anti-inflammatory medicines and formal physical therapy and chiropractic treatment without relief.  She is had injections in the past without relief.  Unfortunately her pain has persisted    Past Medical History:   Diagnosis Date    AP (abdominal pain)     Arthritis     Bilateral sacroiliitis     Cervical spondylosis with myelopathy     Congenital spondylolisthesis of lumbar region     Degenerative spondylolisthesis     Lumbar pain     Neck pain     Osteoarthritis     Osteopenia     Other intervertebral disc degeneration, lumbar region     Other spondylosis with radiculopathy, lumbar region        Past Surgical History:   Procedure Laterality Date    C3-4 ACDF C4-5, C5-6 TDR  12/29/2015    Dr. Blanchard    drainage of lymphocele  2015    Dr. Giles    L3-4, L4-5 ALIF, MIS pedicle screws  05/28/2015    Dr. Dodge    SINUS SURGERY  02/2005       Current Outpatient Medications   Medication Sig    ascorbic acid, vitamin C, (VITAMIN C) 500 MG tablet Take 500 mg by mouth once daily.    aspirin 81 MG Chew Take 81 mg by mouth once daily.    azithromycin (ZITHROMAX) 100 mg/5 mL suspension Daily.    calcium citrate (CALCITRATE) 200 mg (950 mg) tablet Take 3 tablets by mouth nightly.    cholecalciferol, vitamin D3, 125 mcg (5,000 unit) Tab Take 5,000 Units by mouth  once daily.    co-enzyme Q-10 30 mg capsule Take 300 mg by mouth Daily.    cyclobenzaprine (FLEXERIL) 10 MG tablet Take 10 mg by mouth 3 (three) times daily as needed.    estradioL (ESTRACE) 0.01 % (0.1 mg/gram) vaginal cream every 30 days.    glucosamine/chondroitin/C/Luis A (GLUCOSAMINE 1500 COMPLEX ORAL) Take by mouth 3 (three) times daily.    L. acidophilus/L. rhamnosus (FLORAJEN WOMEN ORAL) Take by mouth nightly.    meloxicam (MOBIC) 15 MG tablet Take 15 mg by mouth once daily.    multivitamin (THERAGRAN) per tablet Take 1 tablet by mouth once daily.    omega-3 fatty acids/fish oil (FISH OIL-OMEGA-3 FATTY ACIDS) 300-1,000 mg capsule Take by mouth once daily.    vitamin B complex (B COMPLEX ORAL) Take by mouth Daily.    VITAMIN K2 ORAL Take by mouth Daily.    diclofenac (VOLTAREN) 75 MG EC tablet Take 1 tablet (75 mg total) by mouth 2 (two) times daily. (Patient not taking: Reported on 9/27/2023)    gabapentin (NEURONTIN) 300 MG capsule Take 1 capsule (300 mg total) by mouth every evening for 5 days, THEN 1 capsule (300 mg total) 2 (two) times daily for 5 days, THEN 1 capsule (300 mg total) 3 (three) times daily. (Patient not taking: Reported on 5/30/2022)    mupirocin (BACTROBAN) 2 % ointment 3 (three) times daily.     No current facility-administered medications for this visit.       Review of patient's allergies indicates:   Allergen Reactions    Codeine Hives       Family History   Problem Relation Age of Onset    Asthma Mother     Hyperlipidemia Father     Hypertension Father     Alcohol abuse Father     Lung disease Father     Arthritis Sister        Social History     Socioeconomic History    Marital status:    Tobacco Use    Smoking status: Never    Smokeless tobacco: Never   Substance and Sexual Activity    Alcohol use: Yes     Alcohol/week: 4.0 standard drinks of alcohol     Types: 4 Glasses of wine per week    Drug use: Never    Sexual activity: Yes   Social History Narrative    ** Merged History  "Encounter **            Review of Systems:    Constitution:   Denies chills, fever, and sweats.  HENT:   Denies headaches or blurry vision.  Cardiovascular:  Denies chest pain or irregular heart beat.  Respiratory:   Denies cough or shortness of breath.  Gastrointestinal:  Denies abdominal pain, nausea, or vomiting.  Musculoskeletal:   Denies muscle cramps.  Neurological:   Denies dizziness or focal weakness.  Psychiatric/Behavior: Normal mental status.  Hematology/Lymph:  Denies bleeding problem or easy bruising/bleeding.  Skin:    Denies rash or suspicious lesions.    Examination:    Vital Signs:    Vitals:    09/27/23 1317   BP: 124/73   Pulse: (!) 50   Weight: 59.9 kg (132 lb)   Height: 5' 6" (1.676 m)   PainSc:   3       Body mass index is 21.31 kg/m².    Constitution:   Well-developed, well nourished patient in no acute distress.  Neurological:   Alert and oriented x 3 and cooperative to examination.     Psychiatric/Behavior: Normal mental status.  Respiratory:   No shortness of breath.  Cards:    Pulses palpable and symmetric, brisk cap refill   Eyes:    Extraoccular muscles intact  Skin:    No scars, rash or suspicious lesions.    MSK:   Focused exam of the right hip shows no tenderness over the hip flexors.  She is full range of motion of the hip with limited internal rotation to about 10°.  She is mildly tender in his over external rotators but she has a defect on the lateral aspect of the hamstrings just adjacent to the ischial tuberosity.  She is full range of motion of the knee.  Distally she is neurovascularly intact    Imaging: X-rays ordered and images interpreted today personally by me of three views of the right hip show mild arthrosis.         Assessment: Strain of right hamstring, initial encounter  -     MRI Pelvis Without Contrast; Future; Expected date: 09/27/2023    Primary osteoarthritis of right hip  -     X-Ray Hip 2 or 3 views Right (with Pelvis when performed); Future; Expected date: " 09/27/2023    Pain of right hip  -     MRI Hip Without Contrast Right; Future; Expected date: 09/27/2023        Plan:  We are going to get MRI to evaluate her hamstring insertion.  I will see her back after for review

## 2023-10-02 DIAGNOSIS — S76.311A STRAIN OF RIGHT HAMSTRING, INITIAL ENCOUNTER: Primary | ICD-10-CM

## 2023-10-02 RX ORDER — KETOROLAC TROMETHAMINE 10 MG/1
10 TABLET, FILM COATED ORAL EVERY 6 HOURS
COMMUNITY
End: 2023-10-02 | Stop reason: SDUPTHER

## 2023-10-02 RX ORDER — KETOROLAC TROMETHAMINE 10 MG/1
10 TABLET, FILM COATED ORAL EVERY 8 HOURS
Qty: 15 TABLET | Refills: 0 | Status: SHIPPED | OUTPATIENT
Start: 2023-10-02 | End: 2023-10-07

## 2023-10-11 ENCOUNTER — OFFICE VISIT (OUTPATIENT)
Dept: ORTHOPEDICS | Facility: CLINIC | Age: 63
End: 2023-10-11
Payer: COMMERCIAL

## 2023-10-11 VITALS
HEART RATE: 52 BPM | SYSTOLIC BLOOD PRESSURE: 124 MMHG | HEIGHT: 66 IN | WEIGHT: 132 LBS | DIASTOLIC BLOOD PRESSURE: 73 MMHG | BODY MASS INDEX: 21.21 KG/M2

## 2023-10-11 DIAGNOSIS — M16.11 PRIMARY OSTEOARTHRITIS OF RIGHT HIP: Primary | ICD-10-CM

## 2023-10-11 PROCEDURE — 3078F DIAST BP <80 MM HG: CPT | Mod: CPTII,,, | Performed by: ORTHOPAEDIC SURGERY

## 2023-10-11 PROCEDURE — 1159F MED LIST DOCD IN RCRD: CPT | Mod: CPTII,,, | Performed by: ORTHOPAEDIC SURGERY

## 2023-10-11 PROCEDURE — 3074F PR MOST RECENT SYSTOLIC BLOOD PRESSURE < 130 MM HG: ICD-10-PCS | Mod: CPTII,,, | Performed by: ORTHOPAEDIC SURGERY

## 2023-10-11 PROCEDURE — 3074F SYST BP LT 130 MM HG: CPT | Mod: CPTII,,, | Performed by: ORTHOPAEDIC SURGERY

## 2023-10-11 PROCEDURE — 3008F BODY MASS INDEX DOCD: CPT | Mod: CPTII,,, | Performed by: ORTHOPAEDIC SURGERY

## 2023-10-11 PROCEDURE — 99213 OFFICE O/P EST LOW 20 MIN: CPT | Mod: ,,, | Performed by: ORTHOPAEDIC SURGERY

## 2023-10-11 PROCEDURE — 99213 PR OFFICE/OUTPT VISIT, EST, LEVL III, 20-29 MIN: ICD-10-PCS | Mod: ,,, | Performed by: ORTHOPAEDIC SURGERY

## 2023-10-11 PROCEDURE — 3008F PR BODY MASS INDEX (BMI) DOCUMENTED: ICD-10-PCS | Mod: CPTII,,, | Performed by: ORTHOPAEDIC SURGERY

## 2023-10-11 PROCEDURE — 3078F PR MOST RECENT DIASTOLIC BLOOD PRESSURE < 80 MM HG: ICD-10-PCS | Mod: CPTII,,, | Performed by: ORTHOPAEDIC SURGERY

## 2023-10-11 PROCEDURE — 1159F PR MEDICATION LIST DOCUMENTED IN MEDICAL RECORD: ICD-10-PCS | Mod: CPTII,,, | Performed by: ORTHOPAEDIC SURGERY

## 2023-10-11 RX ORDER — KETOROLAC TROMETHAMINE 10 MG/1
10 TABLET, FILM COATED ORAL EVERY 6 HOURS
COMMUNITY

## 2023-10-11 NOTE — PROGRESS NOTES
Chief Complaint:   Chief Complaint   Patient presents with    Right Hip - Pain    Hip Pain     MRI results right hip, still some pain in hip, also having a little pain in right knee       Consulting Physician: No ref. provider found    History of present illness:    she  is a pleasant 63 y.o. year old female  who is developed right hip and hamstring pain.  She initially fell while at the gym in July of 2021.  The pain is located over buttocks and somewhat lateral.  She knows it worse with lunges and working out.  It is somewhat better with rest.  She is tried anti-inflammatory medicines and formal physical therapy and chiropractic treatment without relief.  She is had injections in the past without relief.  Unfortunately her pain has persisted    She returns status post MRI    Past Medical History:   Diagnosis Date    AP (abdominal pain)     Arthritis     Bilateral sacroiliitis     Cervical spondylosis with myelopathy     Congenital spondylolisthesis of lumbar region     Degenerative spondylolisthesis     Lumbar pain     Neck pain     Osteoarthritis     Osteopenia     Other intervertebral disc degeneration, lumbar region     Other spondylosis with radiculopathy, lumbar region        Past Surgical History:   Procedure Laterality Date    C3-4 ACDF C4-5, C5-6 TDR  12/29/2015    Dr. Blanchard    drainage of lymphocele  2015    Dr. Giles    L3-4, L4-5 ALIF, MIS pedicle screws  05/28/2015    Dr. Dodge    SINUS SURGERY  02/2005       Current Outpatient Medications   Medication Sig    ascorbic acid, vitamin C, (VITAMIN C) 500 MG tablet Take 500 mg by mouth once daily.    aspirin 81 MG Chew Take 81 mg by mouth once daily.    azithromycin (ZITHROMAX) 100 mg/5 mL suspension Daily.    calcium citrate (CALCITRATE) 200 mg (950 mg) tablet Take 3 tablets by mouth nightly.    cholecalciferol, vitamin D3, 125 mcg (5,000 unit) Tab Take 5,000 Units by mouth once daily.    co-enzyme Q-10 30 mg capsule Take 300 mg by mouth Daily.     cyclobenzaprine (FLEXERIL) 10 MG tablet Take 10 mg by mouth 3 (three) times daily as needed.    estradioL (ESTRACE) 0.01 % (0.1 mg/gram) vaginal cream every 30 days.    glucosamine/chondroitin/C/Luis A (GLUCOSAMINE 1500 COMPLEX ORAL) Take by mouth 3 (three) times daily.    ketorolac (TORADOL) 10 mg tablet Take 10 mg by mouth every 6 (six) hours.    L. acidophilus/L. rhamnosus (FLORAJEN WOMEN ORAL) Take by mouth nightly.    meloxicam (MOBIC) 15 MG tablet Take 15 mg by mouth once daily.    multivitamin (THERAGRAN) per tablet Take 1 tablet by mouth once daily.    mupirocin (BACTROBAN) 2 % ointment 3 (three) times daily.    omega-3 fatty acids/fish oil (FISH OIL-OMEGA-3 FATTY ACIDS) 300-1,000 mg capsule Take by mouth once daily.    vitamin B complex (B COMPLEX ORAL) Take by mouth Daily.    VITAMIN K2 ORAL Take by mouth Daily.    diclofenac (VOLTAREN) 75 MG EC tablet Take 1 tablet (75 mg total) by mouth 2 (two) times daily. (Patient not taking: Reported on 9/27/2023)    gabapentin (NEURONTIN) 300 MG capsule Take 1 capsule (300 mg total) by mouth every evening for 5 days, THEN 1 capsule (300 mg total) 2 (two) times daily for 5 days, THEN 1 capsule (300 mg total) 3 (three) times daily. (Patient not taking: Reported on 5/30/2022)     No current facility-administered medications for this visit.       Review of patient's allergies indicates:   Allergen Reactions    Codeine Hives       Family History   Problem Relation Age of Onset    Asthma Mother     Hyperlipidemia Father     Hypertension Father     Alcohol abuse Father     Lung disease Father     Arthritis Sister        Social History     Socioeconomic History    Marital status:    Tobacco Use    Smoking status: Never    Smokeless tobacco: Never   Substance and Sexual Activity    Alcohol use: Yes     Alcohol/week: 4.0 standard drinks of alcohol     Types: 4 Glasses of wine per week    Drug use: Never    Sexual activity: Yes   Social History Narrative    ** Merged  "History Encounter **            Review of Systems:    Constitution:   Denies chills, fever, and sweats.  HENT:   Denies headaches or blurry vision.  Cardiovascular:  Denies chest pain or irregular heart beat.  Respiratory:   Denies cough or shortness of breath.  Gastrointestinal:  Denies abdominal pain, nausea, or vomiting.  Musculoskeletal:   Denies muscle cramps.  Neurological:   Denies dizziness or focal weakness.  Psychiatric/Behavior: Normal mental status.  Hematology/Lymph:  Denies bleeding problem or easy bruising/bleeding.  Skin:    Denies rash or suspicious lesions.    Examination:    Vital Signs:    Vitals:    10/11/23 1009   BP: 124/73   Pulse: (!) 52   Weight: 59.9 kg (132 lb)   Height: 5' 6" (1.676 m)   PainSc:   3       Body mass index is 21.31 kg/m².    Constitution:   Well-developed, well nourished patient in no acute distress.  Neurological:   Alert and oriented x 3 and cooperative to examination.     Psychiatric/Behavior: Normal mental status.  Respiratory:   No shortness of breath.  Cards:    Pulses palpable and symmetric, brisk cap refill   Eyes:    Extraoccular muscles intact  Skin:    No scars, rash or suspicious lesions.    MSK:   Focused exam of the right hip shows no tenderness over the hip flexors.  She is full range of motion of the hip with limited internal rotation to about 10°.  She is mildly tender in his over external rotators but she has a defect on the lateral aspect of the hamstrings just adjacent to the ischial tuberosity.  She is full range of motion of the knee.  Distally she is neurovascularly intact    Imaging: X-rays ordered and images interpreted today personally by me of three views of the right hip show mild arthrosis.         Assessment: Primary osteoarthritis of right hip        Plan:  Hamstring insertion looks fine.  She has a degenerative labral tear and early arthrosis of the right hip.  She is going to manage this with anti-inflammatory medicines going forward.  I " will see her back she has any issues.

## 2025-06-16 ENCOUNTER — OFFICE VISIT (OUTPATIENT)
Dept: ORTHOPEDICS | Facility: CLINIC | Age: 65
End: 2025-06-16
Payer: COMMERCIAL

## 2025-06-16 ENCOUNTER — HOSPITAL ENCOUNTER (OUTPATIENT)
Dept: RADIOLOGY | Facility: CLINIC | Age: 65
Discharge: HOME | End: 2025-06-16
Attending: ORTHOPAEDIC SURGERY
Payer: COMMERCIAL

## 2025-06-16 VITALS
BODY MASS INDEX: 21.22 KG/M2 | SYSTOLIC BLOOD PRESSURE: 138 MMHG | DIASTOLIC BLOOD PRESSURE: 78 MMHG | HEIGHT: 66 IN | HEART RATE: 59 BPM | WEIGHT: 132.06 LBS

## 2025-06-16 DIAGNOSIS — M77.12 LATERAL EPICONDYLITIS OF LEFT ELBOW: Primary | ICD-10-CM

## 2025-06-16 DIAGNOSIS — M25.522 LEFT ELBOW PAIN: ICD-10-CM

## 2025-06-16 PROCEDURE — 3075F SYST BP GE 130 - 139MM HG: CPT | Mod: CPTII,,, | Performed by: ORTHOPAEDIC SURGERY

## 2025-06-16 PROCEDURE — 3008F BODY MASS INDEX DOCD: CPT | Mod: CPTII,,, | Performed by: ORTHOPAEDIC SURGERY

## 2025-06-16 PROCEDURE — 1159F MED LIST DOCD IN RCRD: CPT | Mod: CPTII,,, | Performed by: ORTHOPAEDIC SURGERY

## 2025-06-16 PROCEDURE — 3078F DIAST BP <80 MM HG: CPT | Mod: CPTII,,, | Performed by: ORTHOPAEDIC SURGERY

## 2025-06-16 PROCEDURE — 99213 OFFICE O/P EST LOW 20 MIN: CPT | Mod: ,,, | Performed by: ORTHOPAEDIC SURGERY

## 2025-06-16 PROCEDURE — 73080 X-RAY EXAM OF ELBOW: CPT | Mod: LT,,, | Performed by: ORTHOPAEDIC SURGERY

## 2025-06-16 NOTE — PROGRESS NOTES
Chief Complaint:   Chief Complaint   Patient presents with    Left Elbow - Pain    Pain     Est patient c/o left elbow pain for about 2 months. Has been nursing arm, done needling last week. Certain motions cause pain. Still using arm. C/o pain. X-rays today..        Consulting Physician: No ref. provider found    History of present illness:    she  is a pleasant 65 y.o. year old female  who has had increasing left elbow pain since March of 2025.  One week ago she had traumatic pop and pain in the lateral side of the elbow.  She had associated swelling and bruising.  She has started some physical therapy with dry needling and taping and her pain is gradually improving.  She denies any numbness or tingling    Past Medical History:   Diagnosis Date    AP (abdominal pain)     Arthritis     Bilateral sacroiliitis     Cervical spondylosis with myelopathy     Congenital spondylolisthesis of lumbar region     Degenerative spondylolisthesis     Lumbar pain     Neck pain     Osteoarthritis     Osteopenia     Other intervertebral disc degeneration, lumbar region     Other spondylosis with radiculopathy, lumbar region        Past Surgical History:   Procedure Laterality Date    C3-4 ACDF C4-5, C5-6 TDR  12/29/2015    Dr. Blanchard    drainage of lymphocele  2015    Dr. Giles    L3-4, L4-5 ALIF, MIS pedicle screws  05/28/2015    Dr. Dodge    SINUS SURGERY  02/2005       Current Outpatient Medications   Medication Sig    ascorbic acid, vitamin C, (VITAMIN C) 500 MG tablet Take 500 mg by mouth once daily.    aspirin 81 MG Chew Take 81 mg by mouth once daily.    cholecalciferol, vitamin D3, 125 mcg (5,000 unit) Tab Take 5,000 Units by mouth once daily.    co-enzyme Q-10 30 mg capsule Take 300 mg by mouth Daily.    estradioL (ESTRACE) 0.01 % (0.1 mg/gram) vaginal cream every 30 days.    glucosamine/chondroitin/C/Luis A (GLUCOSAMINE 1500 COMPLEX ORAL) Take by mouth 3 (three) times daily.    L. acidophilus/L. rhamnosus (FLORAJEN  WOMEN ORAL) Take by mouth nightly.    meloxicam (MOBIC) 15 MG tablet Take 15 mg by mouth once daily.    multivitamin (THERAGRAN) per tablet Take 1 tablet by mouth once daily.    omega-3 fatty acids/fish oil (FISH OIL-OMEGA-3 FATTY ACIDS) 300-1,000 mg capsule Take by mouth once daily.    vitamin B complex (B COMPLEX ORAL) Take by mouth Daily.    VITAMIN K2 ORAL Take by mouth Daily.    azithromycin (ZITHROMAX) 100 mg/5 mL suspension Daily.    calcium citrate (CALCITRATE) 200 mg (950 mg) tablet Take 3 tablets by mouth nightly.    cyclobenzaprine (FLEXERIL) 10 MG tablet Take 10 mg by mouth 3 (three) times daily as needed.    diclofenac (VOLTAREN) 75 MG EC tablet Take 1 tablet (75 mg total) by mouth 2 (two) times daily. (Patient not taking: Reported on 9/27/2023)    gabapentin (NEURONTIN) 300 MG capsule Take 1 capsule (300 mg total) by mouth every evening for 5 days, THEN 1 capsule (300 mg total) 2 (two) times daily for 5 days, THEN 1 capsule (300 mg total) 3 (three) times daily. (Patient not taking: Reported on 5/30/2022)    ketorolac (TORADOL) 10 mg tablet Take 10 mg by mouth every 6 (six) hours.    mupirocin (BACTROBAN) 2 % ointment 3 (three) times daily.     No current facility-administered medications for this visit.       Review of patient's allergies indicates:   Allergen Reactions    Codeine Hives       Family History   Problem Relation Name Age of Onset    Asthma Mother      Hyperlipidemia Father      Hypertension Father      Alcohol abuse Father      Lung disease Father      Arthritis Sister         Social History[1]    Review of Systems:    Constitution:   Denies chills, fever, and sweats.  HENT:   Denies headaches or blurry vision.  Cardiovascular:  Denies chest pain or irregular heart beat.  Respiratory:   Denies cough or shortness of breath.  Gastrointestinal:  Denies abdominal pain, nausea, or vomiting.  Musculoskeletal:   Denies muscle cramps.  Neurological:   Denies dizziness or focal  "weakness.  Psychiatric/Behavior: Normal mental status.  Hematology/Lymph:  Denies bleeding problem or easy bruising/bleeding.  Skin:    Denies rash or suspicious lesions.    Examination:    Vital Signs:    Vitals:    06/16/25 1146   BP: 138/78   Pulse: (!) 59   Weight: 59.9 kg (132 lb 0.9 oz)   Height: 5' 5.98" (1.676 m)       Body mass index is 21.32 kg/m².    Constitution:   Well-developed, well nourished patient in no acute distress.  Neurological:   Alert and oriented x 3 and cooperative to examination.     Psychiatric/Behavior: Normal mental status.  Respiratory:   No shortness of breath.  Cards:    Pulses palpable and symmetric, brisk cap refill   Eyes:    Extraoccular muscles intact  Skin:    No scars, rash or suspicious lesions.    MSK:   Focused exam of the left elbow shows some tenderness over the lateral epicondyle but more so in the dorsal aspect of the forearm.  She has some mild pain with finger extension and wrist extension.  She has full range of motion of the elbow.  Distally she is neurovascularly intact    Imaging: X-rays ordered and images interpreted today personally by me of three views of the elbow show normal bony alignment        Assessment: Lateral epicondylitis of left elbow  -     X-Ray Elbow Complete Left; Future; Expected date: 06/16/2025        Plan:  Improving status post therapy and dry needling.  She is going to continue these.  I will see her back as needed.       [1]   Social History  Socioeconomic History    Marital status:    Tobacco Use    Smoking status: Never    Smokeless tobacco: Never   Substance and Sexual Activity    Alcohol use: Yes     Alcohol/week: 4.0 standard drinks of alcohol     Types: 4 Glasses of wine per week    Drug use: Never    Sexual activity: Yes   Social History Narrative    ** Merged History Encounter **          "